# Patient Record
Sex: FEMALE | Race: OTHER | Employment: FULL TIME
[De-identification: names, ages, dates, MRNs, and addresses within clinical notes are randomized per-mention and may not be internally consistent; named-entity substitution may affect disease eponyms.]

---

## 2023-10-12 ENCOUNTER — OFFICE VISIT (OUTPATIENT)
Dept: INTERNAL MEDICINE CLINIC | Facility: CLINIC | Age: 28
End: 2023-10-12
Payer: COMMERCIAL

## 2023-10-12 VITALS
DIASTOLIC BLOOD PRESSURE: 70 MMHG | SYSTOLIC BLOOD PRESSURE: 108 MMHG | WEIGHT: 133.2 LBS | HEART RATE: 90 BPM | HEIGHT: 64 IN | OXYGEN SATURATION: 100 % | BODY MASS INDEX: 22.74 KG/M2

## 2023-10-12 DIAGNOSIS — J06.9 UPPER RESPIRATORY TRACT INFECTION, UNSPECIFIED TYPE: ICD-10-CM

## 2023-10-12 DIAGNOSIS — Z12.4 SCREENING FOR CERVICAL CANCER: ICD-10-CM

## 2023-10-12 DIAGNOSIS — M34.9 SCLERODERMA (HCC): Primary | ICD-10-CM

## 2023-10-12 DIAGNOSIS — Z00.00 ENCOUNTER FOR MEDICAL EXAMINATION TO ESTABLISH CARE: ICD-10-CM

## 2023-10-12 PROCEDURE — 99203 OFFICE O/P NEW LOW 30 MIN: CPT | Performed by: INTERNAL MEDICINE

## 2023-10-12 SDOH — ECONOMIC STABILITY: INCOME INSECURITY: HOW HARD IS IT FOR YOU TO PAY FOR THE VERY BASICS LIKE FOOD, HOUSING, MEDICAL CARE, AND HEATING?: SOMEWHAT HARD

## 2023-10-12 SDOH — ECONOMIC STABILITY: HOUSING INSECURITY
IN THE LAST 12 MONTHS, WAS THERE A TIME WHEN YOU DID NOT HAVE A STEADY PLACE TO SLEEP OR SLEPT IN A SHELTER (INCLUDING NOW)?: NO

## 2023-10-12 SDOH — ECONOMIC STABILITY: FOOD INSECURITY: WITHIN THE PAST 12 MONTHS, YOU WORRIED THAT YOUR FOOD WOULD RUN OUT BEFORE YOU GOT MONEY TO BUY MORE.: NEVER TRUE

## 2023-10-12 SDOH — ECONOMIC STABILITY: FOOD INSECURITY: WITHIN THE PAST 12 MONTHS, THE FOOD YOU BOUGHT JUST DIDN'T LAST AND YOU DIDN'T HAVE MONEY TO GET MORE.: NEVER TRUE

## 2023-10-12 ASSESSMENT — PATIENT HEALTH QUESTIONNAIRE - PHQ9
1. LITTLE INTEREST OR PLEASURE IN DOING THINGS: 0
SUM OF ALL RESPONSES TO PHQ QUESTIONS 1-9: 0
2. FEELING DOWN, DEPRESSED OR HOPELESS: 0
SUM OF ALL RESPONSES TO PHQ QUESTIONS 1-9: 0
SUM OF ALL RESPONSES TO PHQ QUESTIONS 1-9: 0
SUM OF ALL RESPONSES TO PHQ9 QUESTIONS 1 & 2: 0
SUM OF ALL RESPONSES TO PHQ QUESTIONS 1-9: 0

## 2023-10-12 ASSESSMENT — ENCOUNTER SYMPTOMS
GASTROINTESTINAL NEGATIVE: 1
RESPIRATORY NEGATIVE: 1

## 2023-10-12 NOTE — PROGRESS NOTES
FOLLOW UP VISIT    Subjective:    Gilles Curry (: 1995) is a 29 y.o., female,   Chief Complaint   Patient presents with    New Patient    Establish Care     Has Scleroderma- not on meds trying to Pregnant       HPI:  HPI  29year old in to [facility]. She has a URI she states . She had chest discomofort and had a normal CXR went to [facility]   Scleroderma was on MTX, plaquinel, Solumedrol and cell cept in the past needs a new Rheum moved from [facility]  HCM : PAP  nl CPE  does not have GYN here     The following portions of the patient's history were reviewed and updated as appropriate:      Past Medical History:   Diagnosis Date    Scleroderma ([address])     was on Solumedrol, Pred, MTX and Cellcept and Plaquinel       No past surgical history on file. No family history on file. Social History     Socioeconomic History    Marital status:      Spouse name: Not on file    Number of children: Not on file    Years of education: Not on file    Highest education level: Not on file   Occupational History    Not on file   Tobacco Use    Smoking status: Never    Smokeless tobacco: Never   Substance and Sexual Activity    Alcohol use: Never    Drug use: Never    Sexual activity: Not on file   Other Topics Concern    Not on file   Social History Narrative    Not on file     Social Determinants of Health     Financial Resource Strain: Medium Risk (10/12/2023)    Overall Financial Resource Strain (CARDIA)     Difficulty of Paying Living Expenses: Somewhat hard   Food Insecurity: No Food Insecurity (10/12/2023)    Hunger Vital Sign     Worried About Running Out of Food in the Last Year: Never true     Ran Out of Food in the Last Year: Never true   Transportation Needs: Unknown (10/12/2023)    PRAPARE - Transportation     Lack of Transportation (Medical): Not on file     Lack of Transportation (Non-Medical):  No   Physical Activity: Not on file   Stress: Not on file   Social Connections: Not on file   Intimate

## 2023-11-22 ENCOUNTER — OFFICE VISIT (OUTPATIENT)
Dept: OBGYN CLINIC | Age: 28
End: 2023-11-22
Payer: COMMERCIAL

## 2023-11-22 VITALS
DIASTOLIC BLOOD PRESSURE: 62 MMHG | BODY MASS INDEX: 23.22 KG/M2 | SYSTOLIC BLOOD PRESSURE: 110 MMHG | WEIGHT: 136 LBS | HEIGHT: 64 IN

## 2023-11-22 DIAGNOSIS — Z13.228 SCREENING FOR ENDOCRINE, METABOLIC AND IMMUNITY DISORDER: ICD-10-CM

## 2023-11-22 DIAGNOSIS — Z13.29 SCREENING FOR ENDOCRINE, METABOLIC AND IMMUNITY DISORDER: ICD-10-CM

## 2023-11-22 DIAGNOSIS — N94.6 DYSMENORRHEA: ICD-10-CM

## 2023-11-22 DIAGNOSIS — M34.9 SCLERODERMA (HCC): ICD-10-CM

## 2023-11-22 DIAGNOSIS — Z31.41 ENCOUNTER FOR FERTILITY TESTING: Primary | ICD-10-CM

## 2023-11-22 DIAGNOSIS — Z13.0 SCREENING FOR ENDOCRINE, METABOLIC AND IMMUNITY DISORDER: ICD-10-CM

## 2023-11-22 PROCEDURE — 99204 OFFICE O/P NEW MOD 45 MIN: CPT | Performed by: OBSTETRICS & GYNECOLOGY

## 2023-11-22 NOTE — PROGRESS NOTES
New patient here to discuss infertility. Patient and her S/O have been TTC for the last 2 years. Patient was trying to see fertility specialist in [place], [place] but with scheduling and her work she was unable to see them before moving. Patient has been tracking her menses, basal temperatures, and ovulation test strips. Patient states that the [facility] test strips showed little cervical mucus and her S/O have been using a \"Pre-seed\" lube to help increase cervical mucus. Patient's S/O has never had any testing for fertility. Patient also states her menses have always been painful where she states she is \"doubling over in pain. \" As well as experiencing pre-menses cramps. Patient's last US was [employer]" denies hx of cysts and fibroids. LAST PAP:  02/2023 in [facility] in [place]. Patient denies abnormal pap smears. LAST MAMMO:  never-left sided US for painful cyst, cyst was benign, cysts fluctuates with patients menses. LMP:  Patient's last menstrual period was 11/07/2023 (exact date).     BIRTH CONTROL:  none-TTC     TOBACCO USE:  No    FAMILY HISTORY OF:   Breast Cancer:  No   Ovarian Cancer:  No   Uterine Cancer:  No   Colon Cancer:  No    Vitals:    11/22/23 1012   BP: 110/62   Site: Left Upper Arm   Position: Sitting   Weight: 61.7 kg (136 lb)   Height: 1.626 m (5' 4\")        Siria Carter RN  11/22/23  10:24 AM

## 2023-11-22 NOTE — PROGRESS NOTES
[facility]  [place], [address], [address] [place] [address]  [phone]    Yair Swan MD, Stanton Gregorio MD, FACOG  HARMEET Cruz-BC      Assessment/Plan     Bacilio was seen today for annual exam and new patient. Diagnoses and all orders for this visit:    Encounter for fertility testing  Comments:  -has been TTC > 2 years  - given info on PREG for semen analysis  - will check CD #3 labs and CD #21 progesterone   - check pelvic US for structural causes  - pt likely ovulatory as she is having regular cycles  - low risk for tubal disease, no h/o STI. Consider HSG but lower risk. - consider endometriosis as she has dysmenorrhea and fhx of endometriosis (mother). Discussed this is a surgical dx.   - offered referral to [facility], she would like to start with eval here. Does not desire doing IVF. Orders:  -     Progesterone; Future  -     Follicle Stimulating Hormone; Future  -     Estradiol; Future  -     TSH with Reflex; Future  -     Prolactin; Future  -     Hemoglobin A1C; Future  -     Anti Mullerian Hormone; Future    Screening for endocrine, metabolic and immunity disorder  -     TSH with Reflex; Future  -     Prolactin; Future  -     Hemoglobin A1C; Future    Scleroderma (HCC)  Comments:  - no current medications     BMI 23.0-23.9, adult           Subjective     Boneta Gonzalez 29 y.o. Arnaldo Dahl presents today for a gyn problem of infertility. New patient. PMH significant for scleroderma, no meds. She recently moved here from [place], [place]. Family from [place], [place]. Her  is a cardiologist at [id number] [place] [address],[address]. She works in computers. Has been trying to conceive > 2 years. Has not seen gyn or fertility specialist yet. Periods are monthly q 27-28 days, lasts 5-7 days. She will sometimes have 4 days of spotting leading up to menses, no IM bleeding or discharge. She has been tracking period, doing ovulation predictors and tracking basal body.  She has intercourse typically 2-3 days prior to ovulation,

## 2023-11-24 DIAGNOSIS — Z31.41 ENCOUNTER FOR FERTILITY TESTING: ICD-10-CM

## 2023-11-24 LAB — PROGEST SERPL-MCNC: 17.23 NG/ML

## 2023-11-27 ENCOUNTER — TELEPHONE (OUTPATIENT)
Dept: OBGYN CLINIC | Age: 28
End: 2023-11-27

## 2023-11-27 NOTE — TELEPHONE ENCOUNTER
Pt lvm stating she is unsure if she needs to come to her lab appt this morning. (Pt already missed lab appt). Pt stated this past Friday she already had her labs drawn because it was 7-8 days after ovulation and today would've been the 10th day. Pt states she did ovulate on the 14th and has 21 day cycles. Called pt back, pt stated she actually got her labs drawn on Cycle day 7. Informed pt that her next labs will be on day 3 of her period. Pt asked if her Progesterone result was good. Informed pt I will need to ask Dr. Martin Oneal. Pt voiced understanding.

## 2023-11-28 NOTE — TELEPHONE ENCOUNTER
Please let Wade Cardona know that her progesterone level looked great and consistent with ovulation. Typically we want the level over 3-4 and her level was 17. I recommended checking the other labs on CD #3 with the first day of her period counting as CD #1.  Thanks

## 2023-12-06 DIAGNOSIS — Z31.41 ENCOUNTER FOR FERTILITY TESTING: ICD-10-CM

## 2023-12-06 DIAGNOSIS — Z13.228 SCREENING FOR ENDOCRINE, METABOLIC AND IMMUNITY DISORDER: ICD-10-CM

## 2023-12-06 DIAGNOSIS — Z13.0 SCREENING FOR ENDOCRINE, METABOLIC AND IMMUNITY DISORDER: ICD-10-CM

## 2023-12-06 DIAGNOSIS — Z13.29 SCREENING FOR ENDOCRINE, METABOLIC AND IMMUNITY DISORDER: ICD-10-CM

## 2023-12-06 LAB
EST. AVERAGE GLUCOSE BLD GHB EST-MCNC: 91 MG/DL
ESTRADIOL SERPL-MCNC: 57.79 PG/ML
FSH SERPL-ACNC: 8 MIU/ML
HBA1C MFR BLD: 4.8 % (ref 4.8–5.6)
PROLACTIN SERPL-MCNC: 31.5 NG/ML
TSH W FREE THYROID IF ABNORMAL: 2.22 UIU/ML (ref 0.36–3.74)

## 2023-12-11 LAB — MIS SERPL-MCNC: 1.06 NG/ML

## 2023-12-12 ENCOUNTER — TELEPHONE (OUTPATIENT)
Dept: OBGYN CLINIC | Age: 28
End: 2023-12-12

## 2023-12-12 DIAGNOSIS — R79.89 ELEVATED PROLACTIN LEVEL: Primary | ICD-10-CM

## 2023-12-12 NOTE — TELEPHONE ENCOUNTER
Called patient, updated her on labs and slightly elevated prolactin level. Stated that it is recommended she come back in for a repeat fasting prolactin, as well as no exercise/sex/breast stimulation at least 24 hours prior to the lab. Patient verbalized understanding and asked if she can get this done at the [facility] lab instead. Stated to patient that she should call the [facility] lab first and see if it is okay for her to come in without a lab slip. Patient verbalized understanding. Patient will call office back with updates and her decision on where her repeat lab will be completed.

## 2023-12-12 NOTE — TELEPHONE ENCOUNTER
Please let iGorgi Beauchamp know that her labs all look good with the exception of her prolactin being slightly high. I'd like to repeat that fasting in the am with no exercise/sex/breast stimulation for 24 hours prior.  Thanks

## 2023-12-27 DIAGNOSIS — R79.89 ELEVATED PROLACTIN LEVEL: ICD-10-CM

## 2023-12-27 LAB — PROLACTIN SERPL-MCNC: 21.1 NG/ML

## 2024-02-22 ENCOUNTER — OFFICE VISIT (OUTPATIENT)
Dept: OBGYN CLINIC | Age: 29
End: 2024-02-22
Payer: COMMERCIAL

## 2024-02-22 ENCOUNTER — PROCEDURE VISIT (OUTPATIENT)
Dept: OBGYN CLINIC | Age: 29
End: 2024-02-22
Payer: COMMERCIAL

## 2024-02-22 VITALS
HEIGHT: 64 IN | WEIGHT: 140 LBS | BODY MASS INDEX: 23.9 KG/M2 | SYSTOLIC BLOOD PRESSURE: 124 MMHG | DIASTOLIC BLOOD PRESSURE: 64 MMHG

## 2024-02-22 DIAGNOSIS — R79.89 ELEVATED PROLACTIN LEVEL: ICD-10-CM

## 2024-02-22 DIAGNOSIS — M34.9 SCLERODERMA (HCC): ICD-10-CM

## 2024-02-22 DIAGNOSIS — Z31.41 ENCOUNTER FOR FERTILITY TESTING: Primary | ICD-10-CM

## 2024-02-22 DIAGNOSIS — Z31.41 ENCOUNTER FOR FERTILITY TESTING: ICD-10-CM

## 2024-02-22 DIAGNOSIS — N94.6 DYSMENORRHEA: ICD-10-CM

## 2024-02-22 DIAGNOSIS — N92.0 MENORRHAGIA WITH REGULAR CYCLE: Primary | ICD-10-CM

## 2024-02-22 PROCEDURE — 99214 OFFICE O/P EST MOD 30 MIN: CPT | Performed by: OBSTETRICS & GYNECOLOGY

## 2024-02-22 PROCEDURE — 76830 TRANSVAGINAL US NON-OB: CPT | Performed by: OBSTETRICS & GYNECOLOGY

## 2024-02-22 ASSESSMENT — PATIENT HEALTH QUESTIONNAIRE - PHQ9
SUM OF ALL RESPONSES TO PHQ QUESTIONS 1-9: 0
2. FEELING DOWN, DEPRESSED OR HOPELESS: 0
1. LITTLE INTEREST OR PLEASURE IN DOING THINGS: 0
SUM OF ALL RESPONSES TO PHQ QUESTIONS 1-9: 0
SUM OF ALL RESPONSES TO PHQ9 QUESTIONS 1 & 2: 0
SUM OF ALL RESPONSES TO PHQ QUESTIONS 1-9: 0
SUM OF ALL RESPONSES TO PHQ QUESTIONS 1-9: 0

## 2024-02-22 NOTE — PROGRESS NOTES
[facility]  [address], [place] [address]  [phone]    Yany Madrigal MD, FACOG  Shailesh Laureano MD, FACOG  HARMEET Quintero-BC  Assessment/Plan     Shandra was seen today for follow-up and ultrasound.    Diagnoses and all orders for this visit:    Menorrhagia with regular cycle  Comments:  - normal TSH  - pap normal  - no structural issues like polyps/fibroids  -thickened EMS but normal for day of cycle. Possible polyp hidden and causing prolonged spotting. HSG will be able to eval for any intracavitary defects    Dysmenorrhea  Comments:  - normal adnexa on exam  - fhx of endometriosis  - understands this is a surgical dx  - chance that endo can cause scarring etc, will check HSG for tubal patency    Encounter for fertility testing  Comments:  - normal US and labs  - ovulatory- normal cycles and normal progesterone on d21  - semen analysis wnl  - recommend HSG due to h/o dysmenorrhea and prolonged spotting (would eval for tubal patency if endometriosis and also look for intracavitary defects if she were to have a polyp)  - discussed if eval is normal, dx would be unexplained infertility and outcomes better with ovulation induction/IUI vs timed intercourse (ie tx with [facility] physician). She does not want to pursue IVF at this time.                Subjective     Shandra Stanley 28 y.o.  presents today for a gyn problem of dysmenorrhea, menorrhagia and fertility evaluation. TTC > 2 years. Declines [facility] eval at this time. Periods q 27-28 days, lasts 5-7 days. Will sometimes have spotting before or after period that lasts 2-4 days. No IM bleeding or abnormal discharge. 1 lifetime partner. No h/o STI.     Mother h/o endometriosis. She has painful periods, cramping/diarrhea/nausea with menses.     Due for next period 3/6. CD # 7 would be 3/13.     Labs 23:   FSH 8  E2 57.79  TSH 2.22  Prolactin 31.5--> repeat fasting 21.1 (23)  A1C 4.8  AMH 1.06  Progesterone (day 21 on

## 2024-02-22 NOTE — PROGRESS NOTES
Patient comes in today for gyn follow up with ultrasound.     LAST PAP:  02/2023    LAST MAMMO:  Never    LMP:  Patient's last menstrual period was 02/05/2024 (exact date).    BIRTH CONTROL:  none    TOBACCO USE:  No    FAMILY HISTORY OF:   Breast Cancer:  No   Ovarian Cancer:  No   Uterine Cancer:  No   Colon Cancer:  No    Vitals:    02/22/24 0829   BP: 124/64   Site: Left Upper Arm   Position: Sitting   Weight: 63.5 kg (140 lb)   Height: 1.626 m (5' 4\")        Trista Bain MA  02/22/24  8:36 AMP

## 2024-02-23 ENCOUNTER — TELEPHONE (OUTPATIENT)
Dept: OBGYN CLINIC | Age: 29
End: 2024-02-23

## 2024-02-23 ENCOUNTER — TRANSCRIBE ORDERS (OUTPATIENT)
Dept: SCHEDULING | Age: 29
End: 2024-02-23

## 2024-02-23 DIAGNOSIS — N94.6 DYSMENORRHEA: Primary | ICD-10-CM

## 2024-02-23 DIAGNOSIS — N92.0 MENORRHAGIA WITH REGULAR CYCLE: ICD-10-CM

## 2024-02-23 DIAGNOSIS — Z31.41 ENCOUNTER FOR FERTILITY TESTING: ICD-10-CM

## 2024-02-23 NOTE — TELEPHONE ENCOUNTER
Called patient to discuss procedure date with her. No answer - lm with no specific details. Advised she call back to discuss further.

## 2024-02-27 ENCOUNTER — TELEPHONE (OUTPATIENT)
Dept: OBGYN CLINIC | Age: 29
End: 2024-02-27

## 2024-02-27 NOTE — TELEPHONE ENCOUNTER
Called patient to discuss dates for an upcoming procedure. No answer - LM with no specific details. Advised patient to return call.

## 2024-03-13 ENCOUNTER — HOSPITAL ENCOUNTER (OUTPATIENT)
Dept: GENERAL RADIOLOGY | Age: 29
Discharge: HOME | End: 2024-03-16
Attending: OBSTETRICS & GYNECOLOGY
Payer: COMMERCIAL

## 2024-03-13 DIAGNOSIS — N92.0 MENORRHAGIA WITH REGULAR CYCLE: ICD-10-CM

## 2024-03-13 DIAGNOSIS — Z31.41 ENCOUNTER FOR FERTILITY TESTING: ICD-10-CM

## 2024-03-13 DIAGNOSIS — N94.6 DYSMENORRHEA: ICD-10-CM

## 2024-03-13 PROCEDURE — 58340 CATHETER FOR HYSTEROGRAPHY: CPT | Performed by: OBSTETRICS & GYNECOLOGY

## 2024-03-13 PROCEDURE — 74740 X-RAY FEMALE GENITAL TRACT: CPT

## 2024-03-13 PROCEDURE — 6360000004 HC RX CONTRAST MEDICATION: Performed by: OBSTETRICS & GYNECOLOGY

## 2024-03-13 RX ADMIN — IOHEXOL 20 ML: 350 INJECTION, SOLUTION INTRAVENOUS at 09:58

## 2024-03-13 NOTE — PROCEDURES
HSG Procedure Note/ Fertility counseling:     Patient present for HSG. Has been using nothing for contraception. LMP 3/5/24, she is cycle day # 9. A pregnancy test was done and was negative.     A side opening speculum placed in the vagina and there cervix identified. Swabbed with betadine x 2. Tenaculum placed on the anterior lip of the cervix. An acorn uterine manipulator was attached to a syringe full of contrast dye and flushed. The acorn uterine manipulator was then placed through the cervical os. The speculum was removed and the radiologist called to the room. Approximately 5 cc of dye were placed into the uterine cavity. The left tube was patent and dye was noted to spill into the cavity. The right tube was open and dye noted spill into the cavity. The cavity was normal in appearance with no intracavitary defects.     All instruments were then removed from the vagina. The tenaculum site was hemostatic. Patient tolerated the procedure well. No antibiotics indicated according to ACOG PB # 195.     _____________________________________________  Discussed with patient that thus far the evaluation has been negative thus far consistent with unexplained infertility. Discussed possibility of endometriosis due to her history of dysmenorrhea, which would be diagnosed with laparoscopy. However, if she has endometriosis, it has not caused tubal scarring. Possibly could cause an issue with the ovum getting into the fallopian tubes. Discussed with unexplained infertility, the best outcomes are with [facility] providers. Typically ovulation induction medications (clomid/femara) are better used in conjunction with IUI. Additionally, some women require gonadotropin injections or IVF which are not offered in our office but require [facility] specialists. She reports that she is not able to do IUI/IVF due to her Zoroastrianism beliefs. Understands that oral medications may not be the most effective treatment but would like a trial.     R/B/A of

## 2024-03-14 ENCOUNTER — TELEPHONE (OUTPATIENT)
Dept: OBGYN CLINIC | Age: 29
End: 2024-03-14

## 2024-03-14 DIAGNOSIS — Z31.41 ENCOUNTER FOR FERTILITY TESTING: Primary | ICD-10-CM

## 2024-03-14 PROBLEM — N92.0 MENORRHAGIA WITH REGULAR CYCLE: Status: ACTIVE | Noted: 2024-03-14

## 2024-03-14 PROBLEM — N94.6 DYSMENORRHEA: Status: ACTIVE | Noted: 2024-03-14

## 2024-03-14 NOTE — TELEPHONE ENCOUNTER
Please let Shandra know that I am calling in some clomid for her to take. Instructions and risks as below. Have her come in on CD #21 for a progesterone level. I'm ordering that as well as the clomid.     Will Rx 50 mg once daily for 5 days (Cycle day #5-9). Have intercourse every other day starting on CD # 14-20. Check progesterone level on CD #21. If no pregnancy after 3-4 ovulatory cycles, recommend trying another medication or discussing laparoscopy.     Risks and side effects of clomiphene (Clomid) include: hot flashes (10-20%), bloating/ abdominal distension due to ovarian enlargement (14%), N/V, mood swings. Ovarian hyperstimulation is rare. If visual changes occur, which can be a sign of a rare side effect of retinal toxicity, discontinue medication immediately and call the office. Risk of twins approx 7-9%, higher order gestation (triplets etc) is rare.

## 2024-03-15 NOTE — TELEPHONE ENCOUNTER
Called patient, reviewed note below with patient.     Patient will call us on day 1 of her next cycle for us to schedule her day 21 as she stated she is on day 12 today.    Patient verbalized understanding of everything spoken about.     Sent [employer] message to patient re-iterating everything spoken about during phone call for patient to reference back to her instructions with clomid.

## 2024-04-23 DIAGNOSIS — Z31.41 ENCOUNTER FOR FERTILITY TESTING: ICD-10-CM

## 2024-04-23 LAB — PROGEST SERPL-MCNC: 25.2 NG/ML

## 2024-04-24 ENCOUNTER — TELEPHONE (OUTPATIENT)
Dept: OBGYN CLINIC | Age: 29
End: 2024-04-24

## 2024-04-24 DIAGNOSIS — Z31.41 ENCOUNTER FOR FERTILITY TESTING: Primary | ICD-10-CM

## 2024-04-24 NOTE — TELEPHONE ENCOUNTER
Cycle 1-Clomid 50 mg: CD #21 progesterone: 25.2  Cycle 2-Clomid 50 mg: ordered progesterone for CD #21

## 2024-10-07 ENCOUNTER — TELEPHONE (OUTPATIENT)
Dept: PRIMARY CARE CLINIC | Facility: CLINIC | Age: 29
End: 2024-10-07

## 2024-10-07 NOTE — TELEPHONE ENCOUNTER
----- Message from Gisella ABRAMS sent at 10/7/2024 12:54 PM EDT -----  Regarding: [facility] Appointment Request  [facility] Appointment Request    Patient needs appointment for [facility] Appointment Type: Annual Visit.    Patient Requested Dates(s): as soon as possible  Patient Requested Time: anytime  Provider Name: Patsy Mosley MD or any provider that has sooner appointment just for temporary visit.         Reason for Appointment Request: Established Patient - Available appointments did not meet patient need. Patient wants to reshcdule her appointment on 01/02/2025 7:30a for her awv.  --------------------------------------------------------------------------------------------------------------------------    Relationship to Patient: Self     Call Back Information: OK to leave message on voicemail  Preferred Call Back Number: Phone [phone] (mobile)

## 2024-10-08 ENCOUNTER — TELEPHONE (OUTPATIENT)
Dept: PRIMARY CARE CLINIC | Facility: CLINIC | Age: 29
End: 2024-10-08

## 2024-10-08 ENCOUNTER — OFFICE VISIT (OUTPATIENT)
Dept: PRIMARY CARE CLINIC | Facility: CLINIC | Age: 29
End: 2024-10-08
Payer: COMMERCIAL

## 2024-10-08 VITALS
BODY MASS INDEX: 23.73 KG/M2 | OXYGEN SATURATION: 100 % | HEART RATE: 78 BPM | DIASTOLIC BLOOD PRESSURE: 76 MMHG | SYSTOLIC BLOOD PRESSURE: 122 MMHG | WEIGHT: 139 LBS | HEIGHT: 64 IN

## 2024-10-08 DIAGNOSIS — Z00.00 ANNUAL PHYSICAL EXAM: Primary | ICD-10-CM

## 2024-10-08 DIAGNOSIS — M34.9 SCLERODERMA (HCC): Primary | ICD-10-CM

## 2024-10-08 DIAGNOSIS — Z00.00 ANNUAL PHYSICAL EXAM: ICD-10-CM

## 2024-10-08 DIAGNOSIS — M34.9 SCLERODERMA (HCC): ICD-10-CM

## 2024-10-08 LAB
ALBUMIN SERPL-MCNC: 4.4 G/DL (ref 3.5–5)
ALBUMIN/GLOB SERPL: 1.4 (ref 1–1.9)
ALP SERPL-CCNC: 69 U/L (ref 35–104)
ALT SERPL-CCNC: 15 U/L (ref 8–45)
ANION GAP SERPL CALC-SCNC: 9 MMOL/L (ref 9–18)
AST SERPL-CCNC: 19 U/L (ref 15–37)
BASOPHILS # BLD: 0.1 K/UL (ref 0–0.2)
BASOPHILS NFR BLD: 1 % (ref 0–2)
BILIRUB SERPL-MCNC: 0.4 MG/DL (ref 0–1.2)
BUN SERPL-MCNC: 14 MG/DL (ref 6–23)
CALCIUM SERPL-MCNC: 9.7 MG/DL (ref 8.8–10.2)
CHLORIDE SERPL-SCNC: 105 MMOL/L (ref 98–107)
CHOLEST SERPL-MCNC: 143 MG/DL (ref 0–200)
CO2 SERPL-SCNC: 26 MMOL/L (ref 20–28)
CREAT SERPL-MCNC: 0.74 MG/DL (ref 0.6–1.1)
DIFFERENTIAL METHOD BLD: NORMAL
EOSINOPHIL # BLD: 0.1 K/UL (ref 0–0.8)
EOSINOPHIL NFR BLD: 2 % (ref 0.5–7.8)
ERYTHROCYTE [DISTWIDTH] IN BLOOD BY AUTOMATED COUNT: 13.8 % (ref 11.9–14.6)
GLOBULIN SER CALC-MCNC: 3.2 G/DL (ref 2.3–3.5)
GLUCOSE SERPL-MCNC: 93 MG/DL (ref 70–99)
HCT VFR BLD AUTO: 44.8 % (ref 35.8–46.3)
HDLC SERPL-MCNC: 51 MG/DL (ref 40–60)
HDLC SERPL: 2.8 (ref 0–5)
HGB BLD-MCNC: 14.4 G/DL (ref 11.7–15.4)
IMM GRANULOCYTES # BLD AUTO: 0 K/UL (ref 0–0.5)
IMM GRANULOCYTES NFR BLD AUTO: 0 % (ref 0–5)
LDLC SERPL CALC-MCNC: 79 MG/DL (ref 0–100)
LYMPHOCYTES # BLD: 1.4 K/UL (ref 0.5–4.6)
LYMPHOCYTES NFR BLD: 25 % (ref 13–44)
MCH RBC QN AUTO: 29 PG (ref 26.1–32.9)
MCHC RBC AUTO-ENTMCNC: 32.1 G/DL (ref 31.4–35)
MCV RBC AUTO: 90.1 FL (ref 82–102)
MONOCYTES # BLD: 0.4 K/UL (ref 0.1–1.3)
MONOCYTES NFR BLD: 7 % (ref 4–12)
NEUTS SEG # BLD: 3.5 K/UL (ref 1.7–8.2)
NEUTS SEG NFR BLD: 65 % (ref 43–78)
NRBC # BLD: 0 K/UL (ref 0–0.2)
PLATELET # BLD AUTO: 287 K/UL (ref 150–450)
PMV BLD AUTO: 10.1 FL (ref 9.4–12.3)
POTASSIUM SERPL-SCNC: 4.5 MMOL/L (ref 3.5–5.1)
PROT SERPL-MCNC: 7.6 G/DL (ref 6.3–8.2)
RBC # BLD AUTO: 4.97 M/UL (ref 4.05–5.2)
SODIUM SERPL-SCNC: 140 MMOL/L (ref 136–145)
TRIGL SERPL-MCNC: 66 MG/DL (ref 0–150)
TSH, 3RD GENERATION: 0.98 UIU/ML (ref 0.27–4.2)
VLDLC SERPL CALC-MCNC: 13 MG/DL (ref 6–23)
WBC # BLD AUTO: 5.4 K/UL (ref 4.3–11.1)

## 2024-10-08 PROCEDURE — 99395 PREV VISIT EST AGE 18-39: CPT | Performed by: INTERNAL MEDICINE

## 2024-10-08 ASSESSMENT — ENCOUNTER SYMPTOMS
CHOKING: 0
RECTAL PAIN: 0
BLOOD IN STOOL: 0
ANAL BLEEDING: 0
ABDOMINAL PAIN: 0
NAUSEA: 0
BACK PAIN: 0
COUGH: 0
EYE DISCHARGE: 0
ALLERGIC/IMMUNOLOGIC NEGATIVE: 1
DIARRHEA: 0
SINUS PRESSURE: 0
ABDOMINAL DISTENTION: 0
WHEEZING: 0
COLOR CHANGE: 0
EYE REDNESS: 0
EYE PAIN: 0
STRIDOR: 0
GASTROINTESTINAL NEGATIVE: 1
RHINORRHEA: 0
RESPIRATORY NEGATIVE: 1
CONSTIPATION: 0
CHEST TIGHTNESS: 0
EYES NEGATIVE: 1
EYE ITCHING: 0
SHORTNESS OF BREATH: 0

## 2024-10-08 NOTE — PROGRESS NOTES
FOLLOW UP VISIT    Subjective:    Shandra Stanley (: 1995) is a 29 y.o., female,   Chief Complaint   Patient presents with    Annual Exam     2x forms       HPI:  HPI  29 year old in for cpe    The following portions of the patient's history were reviewed and updated as appropriate:      Past Medical History:   Diagnosis Date    Autoimmune disorder (HCC)     Scleroderma (HCC)     was on Solumedrol, Pred, MTX and Cellcept and Plaquinel       No past surgical history on file.    Family History   Problem Relation Age of Onset    Breast Cancer Neg Hx     Colon Cancer Neg Hx     Uterine Cancer Neg Hx     Ovarian Cancer Neg Hx        Social History     Socioeconomic History    Marital status:      Spouse name: Not on file    Number of children: Not on file    Years of education: Not on file    Highest education level: Not on file   Occupational History    Not on file   Tobacco Use    Smoking status: Never    Smokeless tobacco: Never   Vaping Use    Vaping status: Never Used   Substance and Sexual Activity    Alcohol use: Never    Drug use: Never    Sexual activity: Yes     Partners: Male     Birth control/protection: None   Other Topics Concern    Not on file   Social History Narrative    Patient states feeling safe at home, denies abuse.      Social Determinants of Health     Financial Resource Strain: Patient Declined (2024)    Received from [facility]    Overall Financial Resource Strain (CARDIA)     Difficulty of Paying Living Expenses: Patient declined   Food Insecurity: Patient Declined (2024)    Received from [facility]    Hunger Vital Sign     Worried About Running Out of Food in the Last Year: Patient declined     Ran Out of Food in the Last Year: Patient declined   Transportation Needs: Patient Declined (2024)    Received from [facility]    PRAPARE - Transportation     Lack of Transportation (Medical): Patient declined     Lack of Transportation (Non-Medical): Patient

## 2024-10-09 LAB
APPEARANCE UR: CLEAR
BACTERIA URNS QL MICRO: NEGATIVE /HPF
BILIRUB UR QL: NEGATIVE
CASTS URNS QL MICRO: 0 /LPF
COLOR UR: NORMAL
CRYSTALS URNS QL MICRO: 0 /LPF
EPI CELLS #/AREA URNS HPF: NORMAL /HPF (ref 0–5)
GLUCOSE UR STRIP.AUTO-MCNC: NEGATIVE MG/DL
HGB UR QL STRIP: NEGATIVE
HYALINE CASTS URNS QL MICRO: NORMAL /LPF
KETONES UR QL STRIP.AUTO: NEGATIVE MG/DL
LEUKOCYTE ESTERASE UR QL STRIP.AUTO: NEGATIVE
MUCOUS THREADS URNS QL MICRO: 0 /LPF
NITRITE UR QL STRIP.AUTO: NEGATIVE
PH UR STRIP: 7 (ref 5–9)
PROT UR STRIP-MCNC: NEGATIVE MG/DL
RBC #/AREA URNS HPF: NORMAL /HPF (ref 0–5)
SP GR UR REFRACTOMETRY: <1.005 (ref 1–1.02)
URINE CULTURE IF INDICATED: NORMAL
UROBILINOGEN UR QL STRIP.AUTO: 0.2 EU/DL (ref 0.2–1)
WBC URNS QL MICRO: NORMAL /HPF (ref 0–4)

## 2024-11-21 ENCOUNTER — PATIENT MESSAGE (OUTPATIENT)
Dept: PRIMARY CARE CLINIC | Facility: CLINIC | Age: 29
End: 2024-11-21

## 2024-11-21 NOTE — TELEPHONE ENCOUNTER
LVM for Pt to call office to sched a VV, if before 230 today or will have to sched VV when PCP returns to the office on Monday 12/05/24.  Secure chat sent to PSR's to + on for same day VV

## 2024-11-26 ENCOUNTER — OFFICE VISIT (OUTPATIENT)
Dept: OBGYN CLINIC | Age: 29
End: 2024-11-26
Payer: COMMERCIAL

## 2024-11-26 VITALS
BODY MASS INDEX: 23.05 KG/M2 | SYSTOLIC BLOOD PRESSURE: 102 MMHG | DIASTOLIC BLOOD PRESSURE: 64 MMHG | WEIGHT: 135 LBS | HEIGHT: 64 IN

## 2024-11-26 DIAGNOSIS — Z01.411 ENCOUNTER FOR GYNECOLOGICAL EXAMINATION (GENERAL) (ROUTINE) WITH ABNORMAL FINDINGS: Primary | ICD-10-CM

## 2024-11-26 DIAGNOSIS — Z11.9 SCREENING EXAMINATION FOR INFECTIOUS DISEASE: ICD-10-CM

## 2024-11-26 DIAGNOSIS — N94.10 DYSPAREUNIA IN FEMALE: ICD-10-CM

## 2024-11-26 DIAGNOSIS — R35.0 URINARY FREQUENCY: ICD-10-CM

## 2024-11-26 DIAGNOSIS — Z12.4 ENCOUNTER FOR PAPANICOLAOU SMEAR FOR CERVICAL CANCER SCREENING: ICD-10-CM

## 2024-11-26 DIAGNOSIS — Z02.83 ENCOUNTER FOR DRUG SCREENING: ICD-10-CM

## 2024-11-26 DIAGNOSIS — N94.6 DYSMENORRHEA: ICD-10-CM

## 2024-11-26 DIAGNOSIS — Z31.41 ENCOUNTER FOR FERTILITY TESTING: ICD-10-CM

## 2024-11-26 LAB
AMPHET UR QL SCN: NEGATIVE
BARBITURATES UR QL SCN: NEGATIVE
BENZODIAZ UR QL: NEGATIVE
CANNABINOIDS UR QL SCN: NEGATIVE
COCAINE UR QL SCN: NEGATIVE
HIV 1+2 AB+HIV1 P24 AG SERPL QL IA: NONREACTIVE
HIV 1/2 RESULT COMMENT: NORMAL
METHADONE UR QL: NEGATIVE
OPIATES UR QL: NEGATIVE
PCP UR QL: NEGATIVE

## 2024-11-26 PROCEDURE — 99459 PELVIC EXAMINATION: CPT | Performed by: OBSTETRICS & GYNECOLOGY

## 2024-11-26 PROCEDURE — 99395 PREV VISIT EST AGE 18-39: CPT | Performed by: OBSTETRICS & GYNECOLOGY

## 2024-11-26 PROCEDURE — 99213 OFFICE O/P EST LOW 20 MIN: CPT | Performed by: OBSTETRICS & GYNECOLOGY

## 2024-11-26 RX ORDER — OXYCODONE HYDROCHLORIDE 5 MG/1
TABLET ORAL
COMMUNITY
Start: 2024-11-20

## 2024-11-26 RX ORDER — LIDOCAINE 50 MG/G
OINTMENT TOPICAL
Qty: 50 G | Refills: 0 | Status: SHIPPED | OUTPATIENT
Start: 2024-11-26

## 2024-11-26 RX ORDER — IBUPROFEN 800 MG/1
800 TABLET, FILM COATED ORAL EVERY 8 HOURS PRN
COMMUNITY
Start: 2024-11-20

## 2024-11-26 RX ORDER — SCOLOPAMINE TRANSDERMAL SYSTEM 1 MG/1
PATCH, EXTENDED RELEASE TRANSDERMAL
COMMUNITY
Start: 2024-11-20

## 2024-11-26 NOTE — PROGRESS NOTES
Pt comes in today for AE. Pt states she is planning on adopting and wants to know if Dr. Madrigal is able to do some blood work that is needed for the process.   Pt also having surgery done in December for endometriosis.     LAST PAP:  pt states 2021     LAST MAMMO:  never     LMP:  Patient's last menstrual period was 11/26/2024 (exact date).    BIRTH CONTROL:  none    TOBACCO USE:  No    FAMILY HISTORY OF:   Breast Cancer:  No   Ovarian Cancer:  No   Uterine Cancer:  No   Colon Cancer:  No    Vitals:    11/26/24 1000   BP: 102/64   Site: Left Upper Arm   Position: Sitting   Weight: 61.2 kg (135 lb)   Height: 1.626 m (5' 4\")        Hilaria Mitchell MA  11/26/24  10:08 AM

## 2024-11-26 NOTE — PROGRESS NOTES
[facility]  [address], [place] [address]  [phone]    Yany Madrigal MD, FACOG  Shailesh Laureano MD, FACOG  HARMEET Quintero-BC    Assessment/Plan     Shandra was seen today for annual exam.    Diagnoses and all orders for this visit:    Encounter for gynecological examination (general) (routine) with abnormal findings  Comments:  - pap done today  - self breast awareness encouraged  - PCP Dr Mosley  - recent neg colonoscopy  - mammograms age 40    Dyspareunia in female  Comments:  - pain at introitus. Can try topical lidocaine prior to intercourse  - recommend pelvic floor PT  - she is having robotic surgery 12/13 to eval for endo in [place]  Orders:  -     [facility] - Physical Therapy, [facility]  -     lidocaine (XYLOCAINE) 5 % ointment; Apply topically to vaginal introitus 30 min prior to intercourse as needed.    Dysmenorrhea  Comments:  - possible endometriosis based off of hx  - she is seeing a surgeon in [place] and planning robotic surgery 12/13/24  Orders:  -     [facility] - Physical Therapy, [facility]    Screening examination for infectious disease  Comments:  - eval for adoption application- needs HIV and TB testing  Orders:  -     QuantiFERON In Tube; Future  -     HIV 1/2 Ag/Ab, 4TH Generation,W Rflx Confirm; Future  -     QuantiFERON In Tube  -     HIV 1/2 Ag/Ab, 4TH Generation,W Rflx Confirm    Encounter for drug screening  Comments:  - eval for adoption application (needs 5 drug screen)  Orders:  -     Urine Drug Screen; Future  -     Urine Drug Screen    Encounter for Papanicolaou smear for cervical cancer screening  -     PAP LB, Reflex HPV ASCUS (199300); Future  -     PAP LB, Reflex HPV ASCUS (199300)    Urinary frequency  Comments:  - symptoms of frequency and urge longstanding  - could be 2/2 endo, also possibility of IC  - discussed bladder diet, given resources  - discussed bladder train    Encounter for fertility testing  Comments:  - TTC > 3

## 2024-11-26 NOTE — PROGRESS NOTES
Chaperone for Intimate Exam     Chaperone was offer accepted as part of the rooming process    Chaperone: Johanna Finney

## 2024-11-26 NOTE — PATIENT INSTRUCTIONS
Interstitial Cystitis Diet:     https://www.ichelp.org/understanding-ic/diet/what-to-eat/  [email or web address]-[email or web address]-[email or web address]-[email or web address]    PREG robotic surgeon: Louie Cody MD

## 2024-12-02 LAB
COLLECTION METHOD: NORMAL
CYTOLOGIST CVX/VAG CYTO: NORMAL
CYTOLOGY CVX/VAG DOC THIN PREP: NORMAL
DATE OF LMP: NORMAL
HPV REFLEX: NORMAL
Lab: NORMAL
M TB IFN-G BLD-IMP: NEGATIVE
M TB IFN-G CD4+ T-CELLS BLD-ACNC: 0.07 IU/ML
M TBIFN-G CD4+ CD8+T-CELLS BLD-ACNC: 0.12 IU/ML
PAP SOURCE: NORMAL
PATH REPORT.FINAL DX SPEC: NORMAL
QUANTIFERON CRITERIA: NORMAL
QUANTIFERON MITOGEN VALUE: >10 IU/ML
QUANTIFERON NIL VALUE: 0.04 IU/ML
QUANTIFERON, INCUBATION: NORMAL
STAT OF ADQ CVX/VAG CYTO-IMP: NORMAL

## 2024-12-16 ENCOUNTER — TELEPHONE (OUTPATIENT)
Dept: OBGYN CLINIC | Age: 29
End: 2024-12-16

## 2024-12-16 NOTE — TELEPHONE ENCOUNTER
Pt sent treadalong message below:   ALSO FORWARDED MESSAGE TO YOU  \"Hi Dr. Madrigal,      I just wanted to let you know that I finished up my excision surgery and they found stage 3 Endo.      I wanted to say thank you for being such a great provider to me, you're definitely the most caring doctor I've had and I really appreciate all your patience and support.      Your help with the adoption paperwork has also helped us get far ahead in the process before hitting the holiday breaks which would have bumped everything to an unknown time next year.      For the future: is there a new obgyn you recommend?     You will be in my prayers during your new transition and I hope your mental load feels lighter through it all.         Thank You   Shandra Stanley\"

## 2025-01-10 ENCOUNTER — HOSPITAL ENCOUNTER (OUTPATIENT)
Dept: PHYSICAL THERAPY | Age: 30
Setting detail: RECURRING SERIES
Discharge: HOME | End: 2025-01-13
Attending: OBSTETRICS & GYNECOLOGY
Payer: COMMERCIAL

## 2025-01-10 DIAGNOSIS — N94.10 DYSPAREUNIA IN FEMALE: ICD-10-CM

## 2025-01-10 DIAGNOSIS — M62.89 PELVIC FLOOR DYSFUNCTION: Primary | ICD-10-CM

## 2025-01-10 PROCEDURE — 97112 NEUROMUSCULAR REEDUCATION: CPT

## 2025-01-10 PROCEDURE — 97161 PT EVAL LOW COMPLEX 20 MIN: CPT

## 2025-01-10 PROCEDURE — 97530 THERAPEUTIC ACTIVITIES: CPT

## 2025-01-10 ASSESSMENT — PAIN SCALES - GENERAL: PAINLEVEL_OUTOF10: 0

## 2025-01-10 NOTE — THERAPY EVALUATION
Deep Transverse Perineal [x] Right  [x] Left  []DNT Puborectalis [x] Right*  [x] Left*  []DNT   Ischiocavernosus [x] Right  [x] Left  []DNT Compressor Urethra [] Right*  [] Left*  []DNT Pubococcygeus [x] Right*  [x] Left*  []DNT   Bulbocavernosus [x] Right  [x] Left  []DNT   Iliococcygeus [x] Right*  [x] Left*  []DNT       Obturator Internus [] Right  [x] Left  []DNT       Coccygeus [] Right  [] Left  []DNT   Urgency with palpation at above muscles indicated with *    ASSESSMENT   Initial Assessment (1/10/25): Shandra Stanley presents with musculoskeletal limitations including pelvic floor muscle (PFM) tension, reduced PFM Range of Motion (ROM), increased tenderness to palpation of the PFM, altered body mechanics, poor diaphragm excursion, and decreased pelvic floor muscle (PFM) strength.  These limitations are impairing the patient's ability to properly coordinate perineal elevation and descent for normalized PFM function, contributing to urinary dysfunction and sexual dysfunction. As a result, she is limited in her/his ability to participate in household chores, physical activities such as walking, swimming, or other exercise, entertainment activities such as going to a movie or concert, traveling by car or bus for a distance greater then 30 minutes away from home, and social activities outside of the home.      Therapy Problem List: (Impacting functional limitations):    Increased Pain, Decreased Strength, Decreased ROM, Decreased Functional Mobility, Decreased [place] with Home Exercise Program, Decreased Posture, Decreased Body Mechanics, and Decreased Activity Tolerance/Endurance*     Therapy Prognosis:   Excellent     Initial Assessment Complexity:   Low Complexity     PLAN   Effective Dates: 1/10/2025 TO Plan of Care/Certification Expiration Date: 04/10/25     Frequency/Duration: Plan Frequency: 1x/week until end POC     Interventions Planned: (Treatment may consist of any combination of the

## 2025-01-10 NOTE — PROGRESS NOTES
Shandra Jacobjohanna Stanley  : 1995  Primary: [employer] ([employer])  Secondary:  [facility] @ [place] [address]  [facility] [address]  Phone: [phone]  Fax: [phone] Plan Frequency: 1x/week until end POC    Plan of Care/Certification Expiration Date: 04/10/25            Plan of Care/Certification Expiration Date:  Plan of Care/Certification Expiration Date: 04/10/25    Frequency/Duration:   Plan Frequency: 1x/week until end POC      Time In/Out:   Time In: 1002  Time Out: 1100      PT Visit Info:    Total # of Visits to Date: 1  Progress Note Counter: 1      Visit Count:  1    OUTPATIENT PHYSICAL THERAPY:   Treatment Note 1/10/2025       Episode  (dyspareunia, urgency/freq)               Treatment Diagnosis:    Pelvic floor dysfunction  Dyspareunia in female  Medical/Referring Diagnosis:    Dysmenorrhea  Dyspareunia in female    Referring Physician:  Yany Madrigal MD MD Orders:  PT Eval and Treat   Return MD Appt:  10/9/25 with Dr. Mosley    Date of Onset:  Onset Date:  (chronic)     Allergies:   Patient has no known allergies.  Restrictions/Precautions: None        Interventions Planned (Treatment may consist of any combination of the following):     See Assessment Note    Subjective Comments: See PT evaluation from today.    Initial Pain Level:     0/10  Post Session Pain Level:       (none relayed)/10    Medications Last Reviewed:  1/10/2025    Updated Objective Findings:  See Evaluation Note from today    RANGE OF MOTION Date:    TBA next visit Date:      RIGHT LEFT RIGHT LEFT   Lumbar Extension     Lumbar Flexion     Lumbar Rotation       Lumbar Lateral Flexion       Hip Extension       Hip Flexion       Hip Internal Rotation       Hip External Rotation       Hamstring Length at 90* Hip Flexion       Piriformis Flexibility         STRENGTH Date:   TBA next visit Date:      RIGHT LEFT RIGHT LEFT   Hip Flexion (L2/3)       Hip Extension (L4-S1)

## 2025-01-16 ENCOUNTER — HOSPITAL ENCOUNTER (OUTPATIENT)
Dept: PHYSICAL THERAPY | Age: 30
Setting detail: RECURRING SERIES
Discharge: HOME | End: 2025-01-19
Attending: OBSTETRICS & GYNECOLOGY
Payer: COMMERCIAL

## 2025-01-16 PROCEDURE — 97530 THERAPEUTIC ACTIVITIES: CPT

## 2025-01-16 PROCEDURE — 97112 NEUROMUSCULAR REEDUCATION: CPT

## 2025-01-16 ASSESSMENT — PAIN SCALES - GENERAL: PAINLEVEL_OUTOF10: 0

## 2025-01-16 NOTE — PROGRESS NOTES
Shandra Stanley  : 1995  Primary: [employer] Employees ([employer])  Secondary:  [facility] @ [place] [address]  [facility] [address]  Phone: [phone]  Fax: [phone] Plan Frequency: 1x/week until end POC    Plan of Care/Certification Expiration Date: 04/10/25            Plan of Care/Certification Expiration Date:  Plan of Care/Certification Expiration Date: 04/10/25    Frequency/Duration:   Plan Frequency: 1x/week until end POC      Time In/Out:   Time In: 0902  Time Out: 1001      PT Visit Info:    Total # of Visits to Date: 2  Progress Note Counter: 2      Visit Count:  2    OUTPATIENT PHYSICAL THERAPY:   Treatment Note 2025       Episode  (dyspareunia, urgency/freq)               Treatment Diagnosis:    Pelvic floor dysfunction  Dyspareunia in female  Medical/Referring Diagnosis:    Dysmenorrhea  Dyspareunia in female    Referring Physician:  Yany Madrigal MD MD Orders:  PT Eval and Treat   Return MD Appt:  10/9/25 with Dr. Mosley    Date of Onset:  Onset Date:  (chronic)     Allergies:   Patient has no known allergies.  Restrictions/Precautions: None        Interventions Planned (Treatment may consist of any combination of the following):     See Assessment Note    Subjective Comments: She felt a little off when she got home from last visit. The next day she began to have itching at her labia. Denies abnormal or vaginal discharge, no ordor, vulvar pain. Some burning and continuous itching. No intercourse since prior to last visit.     Removed appt for week of  as that is the week of her menses.    Initial Pain Level:     0/10  Post Session Pain Level:      0/10     Medications Last Reviewed:  2025    Updated Objective Findings:  None Today    RANGE OF MOTION Date:    25 Date:      RIGHT LEFT RIGHT LEFT   Lumbar Extension 100%    Lumbar Flexion 100%    Lumbar Rotation 100% 100%     Lumbar Lateral Flexion 100% 100%     Hip

## 2025-01-22 ENCOUNTER — APPOINTMENT (OUTPATIENT)
Dept: PHYSICAL THERAPY | Age: 30
End: 2025-01-22
Attending: OBSTETRICS & GYNECOLOGY
Payer: COMMERCIAL

## 2025-01-24 ENCOUNTER — HOSPITAL ENCOUNTER (OUTPATIENT)
Dept: PHYSICAL THERAPY | Age: 30
Setting detail: RECURRING SERIES
Discharge: HOME | End: 2025-01-27
Attending: OBSTETRICS & GYNECOLOGY
Payer: COMMERCIAL

## 2025-01-24 PROCEDURE — 97112 NEUROMUSCULAR REEDUCATION: CPT

## 2025-01-24 PROCEDURE — 97530 THERAPEUTIC ACTIVITIES: CPT

## 2025-01-24 ASSESSMENT — PAIN SCALES - GENERAL: PAINLEVEL_OUTOF10: 0

## 2025-01-24 NOTE — PROGRESS NOTES
Shandra Stanley  : 1995  Primary: [employer] Employees ([employer])  Secondary:  [facility] @ [place] [address]  [facility] [address]  Phone: [phone]  Fax: [phone] Plan Frequency: 1x/week until end POC    Plan of Care/Certification Expiration Date: 04/10/25            Plan of Care/Certification Expiration Date:  Plan of Care/Certification Expiration Date: 04/10/25    Frequency/Duration:   Plan Frequency: 1x/week until end POC      Time In/Out:   Time In: 1006  Time Out: 1100      PT Visit Info:    Total # of Visits to Date: 3  Progress Note Counter: 3      Visit Count:  3    OUTPATIENT PHYSICAL THERAPY:   Treatment Note 2025       Episode  (dyspareunia, urgency/freq)               Treatment Diagnosis:    Pelvic floor dysfunction  Dyspareunia in female  Medical/Referring Diagnosis:    Dysmenorrhea  Dyspareunia in female    Referring Physician:  Yany Madrigal MD MD Orders:  PT Eval and Treat   Return MD Appt:  10/9/25 with Dr. Mosley    Date of Onset:  Onset Date:  (chronic)     Allergies:   Patient has no known allergies.  Restrictions/Precautions: None        Interventions Planned (Treatment may consist of any combination of the following):     See Assessment Note    Subjective Comments: She does feel that it is the gel in the clinic that caused a reaction - it has mostly resolved now, she has brought her lubrication from home. She did have some soreness during intercourse after the perineal massage but not during. Things did feel better with intercourse but the entrance was worse. No soreness at incision sites with intercourse, but does have some after her scar tissue mobility. She is noticing when she is holding tension in her pelvic floor and is practicing her breathing.    Initial Pain Level:     0/10  Post Session Pain Level:       (none relayed)/10     Medications Last Reviewed:  2025    Updated Objective Findings:  None

## 2025-01-29 ENCOUNTER — APPOINTMENT (OUTPATIENT)
Dept: PHYSICAL THERAPY | Age: 30
End: 2025-01-29
Attending: OBSTETRICS & GYNECOLOGY
Payer: COMMERCIAL

## 2025-07-29 LAB
CHOLEST SERPL-MCNC: 142 MG/DL (ref 0–200)
GLUCOSE SERPL-MCNC: 92 MG/DL (ref 70–99)
HDLC SERPL-MCNC: 54 MG/DL (ref 40–60)
LDLC SERPL CALC-MCNC: 79 MG/DL (ref 0–100)
TRIGL SERPL-MCNC: 49 MG/DL (ref 0–150)